# Patient Record
Sex: MALE | Race: WHITE
[De-identification: names, ages, dates, MRNs, and addresses within clinical notes are randomized per-mention and may not be internally consistent; named-entity substitution may affect disease eponyms.]

---

## 2020-02-26 ENCOUNTER — HOSPITAL ENCOUNTER (OUTPATIENT)
Dept: HOSPITAL 76 - LAB.WCP | Age: 70
Discharge: HOME | End: 2020-02-26
Attending: UROLOGY
Payer: MEDICARE

## 2020-02-26 DIAGNOSIS — C61: Primary | ICD-10-CM

## 2020-02-26 PROCEDURE — 36415 COLL VENOUS BLD VENIPUNCTURE: CPT

## 2020-02-26 PROCEDURE — 84153 ASSAY OF PSA TOTAL: CPT

## 2020-09-18 ENCOUNTER — HOSPITAL ENCOUNTER (OUTPATIENT)
Dept: HOSPITAL 76 - LAB.WCP | Age: 70
Discharge: HOME | End: 2020-09-18
Attending: RADIOLOGY
Payer: MEDICARE

## 2020-09-18 DIAGNOSIS — Z85.46: Primary | ICD-10-CM

## 2020-09-18 PROCEDURE — 36415 COLL VENOUS BLD VENIPUNCTURE: CPT

## 2020-09-18 PROCEDURE — 84153 ASSAY OF PSA TOTAL: CPT

## 2021-03-30 ENCOUNTER — HOSPITAL ENCOUNTER (OUTPATIENT)
Dept: HOSPITAL 76 - LAB.WCP | Age: 71
Discharge: HOME | End: 2021-03-30
Attending: PHYSICIAN ASSISTANT
Payer: MEDICARE

## 2021-03-30 DIAGNOSIS — E78.5: Primary | ICD-10-CM

## 2021-03-30 DIAGNOSIS — C61: ICD-10-CM

## 2021-03-30 DIAGNOSIS — J44.9: ICD-10-CM

## 2021-03-30 LAB
ALBUMIN DIAFP-MCNC: 4.3 G/DL (ref 3.2–5.5)
ALBUMIN/GLOB SERPL: 1.5 {RATIO} (ref 1–2.2)
ALP SERPL-CCNC: 47 IU/L (ref 42–121)
ALT SERPL W P-5'-P-CCNC: 20 IU/L (ref 10–60)
ANION GAP SERPL CALCULATED.4IONS-SCNC: 8 MMOL/L (ref 6–13)
AST SERPL W P-5'-P-CCNC: 20 IU/L (ref 10–42)
BASOPHILS NFR BLD AUTO: 0.1 10^3/UL (ref 0–0.1)
BASOPHILS NFR BLD AUTO: 0.8 %
BILIRUB BLD-MCNC: 0.9 MG/DL (ref 0.2–1)
BUN SERPL-MCNC: 21 MG/DL (ref 6–20)
CALCIUM UR-MCNC: 9.1 MG/DL (ref 8.5–10.3)
CHLORIDE SERPL-SCNC: 105 MMOL/L (ref 101–111)
CHOLEST SERPL-MCNC: 123 MG/DL
CO2 SERPL-SCNC: 23 MMOL/L (ref 21–32)
CREAT SERPLBLD-SCNC: 0.9 MG/DL (ref 0.6–1.2)
EOSINOPHIL # BLD AUTO: 0.1 10^3/UL (ref 0–0.7)
EOSINOPHIL NFR BLD AUTO: 2.3 %
ERYTHROCYTE [DISTWIDTH] IN BLOOD BY AUTOMATED COUNT: 13.1 % (ref 12–15)
GFRSERPLBLD MDRD-ARVRAT: 83 ML/MIN/{1.73_M2} (ref 89–?)
GLOBULIN SER-MCNC: 2.9 G/DL (ref 2.1–4.2)
GLUCOSE SERPL-MCNC: 92 MG/DL (ref 70–100)
HCT VFR BLD AUTO: 45.1 % (ref 42–52)
HDLC SERPL-MCNC: 42 MG/DL
HDLC SERPL: 2.9 {RATIO} (ref ?–5)
HGB UR QL STRIP: 15.3 G/DL (ref 14–18)
LDLC SERPL CALC-MCNC: 63 MG/DL
LDLC/HDLC SERPL: 1.5 {RATIO} (ref ?–3.6)
LYMPHOCYTES # SPEC AUTO: 1.9 10^3/UL (ref 1.5–3.5)
LYMPHOCYTES NFR BLD AUTO: 30.9 %
MCH RBC QN AUTO: 32.1 PG (ref 27–31)
MCHC RBC AUTO-ENTMCNC: 33.9 G/DL (ref 32–36)
MCV RBC AUTO: 94.5 FL (ref 80–94)
MONOCYTES # BLD AUTO: 0.5 10^3/UL (ref 0–1)
MONOCYTES NFR BLD AUTO: 8.8 %
NEUTROPHILS # BLD AUTO: 3.5 10^3/UL (ref 1.5–6.6)
NEUTROPHILS # SNV AUTO: 6.1 X10^3/UL (ref 4.8–10.8)
NEUTROPHILS NFR BLD AUTO: 56.9 %
NRBC # BLD AUTO: 0 /100WBC
NRBC # BLD AUTO: 0 X10^3/UL
PDW BLD AUTO: 10.2 FL (ref 7.4–11.4)
PLATELET # BLD: 258 10^3/UL (ref 130–450)
POTASSIUM SERPL-SCNC: 4.1 MMOL/L (ref 3.5–5)
PROT SPEC-MCNC: 7.2 G/DL (ref 6.7–8.2)
RBC MAR: 4.77 10^6/UL (ref 4.7–6.1)
SODIUM SERPLBLD-SCNC: 136 MMOL/L (ref 135–145)
TRIGL P FAST SERPL-MCNC: 90 MG/DL
VLDLC SERPL-SCNC: 18 MG/DL

## 2021-03-30 PROCEDURE — 80061 LIPID PANEL: CPT

## 2021-03-30 PROCEDURE — 84153 ASSAY OF PSA TOTAL: CPT

## 2021-03-30 PROCEDURE — 85025 COMPLETE CBC W/AUTO DIFF WBC: CPT

## 2021-03-30 PROCEDURE — 36415 COLL VENOUS BLD VENIPUNCTURE: CPT

## 2021-03-30 PROCEDURE — 83721 ASSAY OF BLOOD LIPOPROTEIN: CPT

## 2021-03-30 PROCEDURE — 80053 COMPREHEN METABOLIC PANEL: CPT

## 2021-04-23 ENCOUNTER — HOSPITAL ENCOUNTER (OUTPATIENT)
Dept: HOSPITAL 76 - DI | Age: 71
Discharge: HOME | End: 2021-04-23
Attending: PHYSICIAN ASSISTANT
Payer: MEDICARE

## 2021-04-23 DIAGNOSIS — Z12.2: Primary | ICD-10-CM

## 2021-04-23 DIAGNOSIS — F17.219: ICD-10-CM

## 2021-04-23 DIAGNOSIS — R93.2: ICD-10-CM

## 2021-04-23 NOTE — CT REPORT
PROCEDURE:  Low Dose Lung Cancer Screen

 

INDICATIONS:  HX OF SMOKING

 

TECHNIQUE:  

Noncontrast low-dose 5 mm thick sections acquired from the pulmonary apices to the posterior costophr
enic angles.  7 mm thick coronal and sagittal MIP reformats were then acquired.  For radiation dose r
eduction, the following was used:  automated exposure control, adjustment of mA and/or kV according t
o patient size.

 

COMPARISON:  CT chest 12/26/2015, CT IVP 11/28/2018

 

FINDINGS:  

Image quality:  Excellent.  

 

Lungs and pleura:  No effusions or consolidations. No visualized masses or nodules.

 

Mediastinum:  Heart size is normal. Unchanged appearance of mild anterior pericardial effusion.  No m
ediastinal adenopathy by size criteria.  Thoracic aorta and central pulmonary arteries are normal in 
size.  Esophagus is normal in caliber.  No hiatal hernia.  

 

Bones and chest wall:  No suspicious bony lesions.  No vertebral body compression fractures.  No axil
aashish or supraclavicular adenopathy by size criteria.  Thyroid is poorly visualized secondary to artif
act.  

 

Abdomen: Low-attenuation focus in the anterior superior hepatic dome measures 2.1 cm AP by 2.2 cm tra
nsverse compared to 1.4 cm AP by 2.2 cm transverse. Hounsfield units measure approximately 16. Otherw
ise, visualized  upper abdomen solid organs and bowel loops appear normal in the absence of contrast.
  

 

IMPRESSION:  

 

1. Lungs are clear.

 

2. Low-attenuation hepatic structure mildly enlarged with Hounsfield units most suggestive of hepatic
 cyst.

 

Lung rads category 1. Recommend annual screening follow-up.

 

Reviewed by: Patt Flores MD on 4/23/2021 12:29 PM PDT

Approved by: Patt Flores MD on 4/23/2021 12:29 PM PDT

 

 

Station ID:  [id number]

## 2021-09-09 ENCOUNTER — HOSPITAL ENCOUNTER (OUTPATIENT)
Dept: HOSPITAL 76 - LAB.WCP | Age: 71
Discharge: HOME | End: 2021-09-09
Attending: RADIOLOGY
Payer: MEDICARE

## 2021-09-09 DIAGNOSIS — C61: Primary | ICD-10-CM

## 2021-09-09 PROCEDURE — 84153 ASSAY OF PSA TOTAL: CPT

## 2021-09-09 PROCEDURE — 36415 COLL VENOUS BLD VENIPUNCTURE: CPT

## 2022-01-05 ENCOUNTER — HOSPITAL ENCOUNTER (OUTPATIENT)
Dept: HOSPITAL 76 - LAB.WCP | Age: 72
Discharge: HOME | End: 2022-01-05
Attending: PHYSICIAN ASSISTANT
Payer: MEDICARE

## 2022-01-05 DIAGNOSIS — E78.5: Primary | ICD-10-CM

## 2022-01-05 DIAGNOSIS — J44.9: ICD-10-CM

## 2022-01-05 LAB
ALBUMIN DIAFP-MCNC: 4 G/DL (ref 3.2–5.5)
ALBUMIN/GLOB SERPL: 1.3 {RATIO} (ref 1–2.2)
ALP SERPL-CCNC: 48 IU/L (ref 42–121)
ALT SERPL W P-5'-P-CCNC: 16 IU/L (ref 10–60)
ANION GAP SERPL CALCULATED.4IONS-SCNC: 8 MMOL/L (ref 6–13)
AST SERPL W P-5'-P-CCNC: 16 IU/L (ref 10–42)
BASOPHILS NFR BLD AUTO: 0 10^3/UL (ref 0–0.1)
BASOPHILS NFR BLD AUTO: 0.8 %
BILIRUB BLD-MCNC: 0.5 MG/DL (ref 0.2–1)
BUN SERPL-MCNC: 25 MG/DL (ref 6–20)
CALCIUM UR-MCNC: 9.2 MG/DL (ref 8.5–10.3)
CHLORIDE SERPL-SCNC: 107 MMOL/L (ref 101–111)
CHOLEST SERPL-MCNC: 122 MG/DL
CO2 SERPL-SCNC: 26 MMOL/L (ref 21–32)
CREAT SERPLBLD-SCNC: 1 MG/DL (ref 0.6–1.2)
EOSINOPHIL # BLD AUTO: 0.2 10^3/UL (ref 0–0.7)
EOSINOPHIL NFR BLD AUTO: 3.2 %
ERYTHROCYTE [DISTWIDTH] IN BLOOD BY AUTOMATED COUNT: 13.3 % (ref 12–15)
GFRSERPLBLD MDRD-ARVRAT: 73 ML/MIN/{1.73_M2} (ref 89–?)
GLOBULIN SER-MCNC: 3.1 G/DL (ref 2.1–4.2)
GLUCOSE SERPL-MCNC: 100 MG/DL (ref 70–100)
HCT VFR BLD AUTO: 47.2 % (ref 42–52)
HDLC SERPL-MCNC: 44 MG/DL
HDLC SERPL: 2.8 {RATIO} (ref ?–5)
HGB UR QL STRIP: 15.6 G/DL (ref 14–18)
LDLC SERPL CALC-MCNC: 58 MG/DL
LDLC/HDLC SERPL: 1.3 {RATIO} (ref ?–3.6)
LYMPHOCYTES # SPEC AUTO: 1.7 10^3/UL (ref 1.5–3.5)
LYMPHOCYTES NFR BLD AUTO: 32.4 %
MCH RBC QN AUTO: 31.6 PG (ref 27–31)
MCHC RBC AUTO-ENTMCNC: 33.1 G/DL (ref 32–36)
MCV RBC AUTO: 95.7 FL (ref 80–94)
MONOCYTES # BLD AUTO: 0.6 10^3/UL (ref 0–1)
MONOCYTES NFR BLD AUTO: 10.8 %
NEUTROPHILS # BLD AUTO: 2.8 10^3/UL (ref 1.5–6.6)
NEUTROPHILS # SNV AUTO: 5.3 X10^3/UL (ref 4.8–10.8)
NEUTROPHILS NFR BLD AUTO: 52.4 %
NRBC # BLD AUTO: 0 /100WBC
NRBC # BLD AUTO: 0 X10^3/UL
PDW BLD AUTO: 10.3 FL (ref 7.4–11.4)
PLATELET # BLD: 260 10^3/UL (ref 130–450)
POTASSIUM SERPL-SCNC: 4.3 MMOL/L (ref 3.5–5)
PROT SPEC-MCNC: 7.1 G/DL (ref 6.7–8.2)
RBC MAR: 4.93 10^6/UL (ref 4.7–6.1)
SODIUM SERPLBLD-SCNC: 141 MMOL/L (ref 135–145)
TRIGL P FAST SERPL-MCNC: 100 MG/DL
VLDLC SERPL-SCNC: 20 MG/DL

## 2022-01-05 PROCEDURE — 80061 LIPID PANEL: CPT

## 2022-01-05 PROCEDURE — 85025 COMPLETE CBC W/AUTO DIFF WBC: CPT

## 2022-01-05 PROCEDURE — 83721 ASSAY OF BLOOD LIPOPROTEIN: CPT

## 2022-01-05 PROCEDURE — 36415 COLL VENOUS BLD VENIPUNCTURE: CPT

## 2022-01-05 PROCEDURE — 80053 COMPREHEN METABOLIC PANEL: CPT

## 2022-03-25 ENCOUNTER — HOSPITAL ENCOUNTER (OUTPATIENT)
Dept: HOSPITAL 76 - LAB.N | Age: 72
Discharge: HOME | End: 2022-03-25
Attending: UROLOGY
Payer: MEDICARE

## 2022-03-25 DIAGNOSIS — C61: Primary | ICD-10-CM

## 2022-03-25 PROCEDURE — 84153 ASSAY OF PSA TOTAL: CPT

## 2022-03-25 PROCEDURE — 36415 COLL VENOUS BLD VENIPUNCTURE: CPT

## 2022-10-05 ENCOUNTER — HOSPITAL ENCOUNTER (OUTPATIENT)
Dept: HOSPITAL 76 - DI | Age: 72
Discharge: HOME | End: 2022-10-05
Attending: UROLOGY
Payer: MEDICARE

## 2022-10-05 DIAGNOSIS — N20.0: Primary | ICD-10-CM

## 2022-10-05 NOTE — XRAY REPORT
PROCEDURE:  Abdomen 1 View X-Ray

 

INDICATIONS:  NEPHROLITHIASIS

 

TECHNIQUE:  One view of the abdomen acquired.  

 

COMPARISON:  Ultrasound kidneys 3/20/2019

 

FINDINGS:  

 

Surgical changes and devices:  None.  

 

Bowel:  Bowel gas pattern is normal.  

 

Soft tissues:  No suspicious abdominal calcifications.  Visualized solid organ contours appear normal
 in size.  

 

Bones:  No suspicious bony lesions.  

 

IMPRESSION:  

Both renal shadows are obscured by fecal debris. Consider follow-up CT

 

Reviewed by: Jluis Agosto MD on 10/5/2022 12:40 PM AKDT

Approved by: Jluis Agosto MD on 10/5/2022 12:40 PM AKDT

 

 

Station ID:  [id number]

## 2023-05-26 ENCOUNTER — HOSPITAL ENCOUNTER (OUTPATIENT)
Dept: HOSPITAL 76 - LAB.N | Age: 73
Discharge: HOME | End: 2023-05-26
Attending: PHYSICIAN ASSISTANT
Payer: MEDICARE

## 2023-05-26 DIAGNOSIS — C61: Primary | ICD-10-CM

## 2023-05-26 PROCEDURE — 84153 ASSAY OF PSA TOTAL: CPT

## 2023-05-26 PROCEDURE — 36415 COLL VENOUS BLD VENIPUNCTURE: CPT

## 2023-07-27 ENCOUNTER — HOSPITAL ENCOUNTER (OUTPATIENT)
Dept: HOSPITAL 76 - DI | Age: 73
Discharge: HOME | End: 2023-07-27
Attending: PHYSICIAN ASSISTANT
Payer: MEDICARE

## 2023-07-27 DIAGNOSIS — N28.1: ICD-10-CM

## 2023-07-27 DIAGNOSIS — Z12.2: Primary | ICD-10-CM

## 2023-07-27 DIAGNOSIS — I25.10: ICD-10-CM

## 2023-07-27 DIAGNOSIS — F17.210: ICD-10-CM

## 2023-07-27 DIAGNOSIS — S22.20XA: ICD-10-CM

## 2023-07-27 DIAGNOSIS — K44.9: ICD-10-CM

## 2023-07-27 DIAGNOSIS — N20.0: ICD-10-CM

## 2023-07-27 NOTE — XRAY REPORT
PROCEDURE:  Abdomen 1 View X-Ray

 

INDICATIONS:  NEPHROLITHIASIS

 

TECHNIQUE:  One view of the abdomen acquired.  

 

COMPARISON:  CT 10/5/2022

 

FINDINGS:  

 

Surgical changes and devices:  None.  

 

Bowel:  Bowel gas pattern is normal.  

 

Soft tissues:  No suspicious abdominal calcifications.  Visualized solid organ contours appear normal
 in size.  

 

Bones:  No suspicious bony lesions.  

 

IMPRESSION:  

No acute abdominal pathology. No visualized calcifications overlying the visualized renal shadows.

 

 

 

Reviewed by: Patt Flores MD on 7/27/2023 1:10 PM PDT

Approved by: Patt Flores MD on 7/27/2023 1:10 PM PDT

 

 

Station ID:  [id number]

## 2023-07-27 NOTE — CT REPORT
PROCEDURE:  Low Dose Lung Cancer Screen

 

INDICATIONS:  SMOKER

 

TECHNIQUE:  

A CT scan of the chest was performed. Intravenous contrast media was not administered. Images were re
corded and evaluated at appropriate window settings. Reformats: axial MIP of the chest, coronal and s
agittal. For radiation dose reduction, the following was used: automated exposure control, adjustment
 of mA and/or kV according to patient size. 

 

COMPARISON:  Chest, 4/23/2021.

 

FINDINGS:  

Image quality:  Excellent.  

 

Lungs and pleura: No pleural effusions. No  pneumothorax. No suspicious pulmonary nodules which requi
re follow up. 

 

Mediastinum: Heart size is normal. Severe coronary artery atherosclerosis. No pericardial effusion. N
o large vessel abnormality. No mediastinal adenopathy by size criteria.  Small hiatal hernia. 

 

Chest wall and lower neck: Thyroid is unremarkable. No axillary or supraclavicular adenopathy by size
. 

 

Bones: There is a comminuted sternal fracture, new since the last exam.  No aggressive osseous abnorm
ality.

 

Upper Abdomen: There is a 4 mm stone in left kidney. No hydronephrosis. A 8.7 x 6.5 cm cyst is seen i
n the superior pole of the right kidney. A couple of hypodense nodules in liver most likely cysts.

 

IMPRESSION:

 

 

1. Lung RADS: 1 - Negative.

Recommendation: Continue annual screening in 12 Months with LDCT

 

2. Comminuted sternal fracture, new since the last exam. Recommend clinical correlation for history o
f trauma. If no history of trauma, this could represent a pathological fracture.

 

3. Please see body of report for other mediastinal findings.

 

Reviewed by: Nyasia Bernard MD on 7/27/2023 10:04 AM PDT

Approved by: Nyasia Bernard MD on 7/27/2023 10:04 AM PDT

 

 

Station ID:  [id number]

## 2024-04-04 ENCOUNTER — HOSPITAL ENCOUNTER (OUTPATIENT)
Dept: HOSPITAL 76 - LAB.N | Age: 74
Discharge: HOME | End: 2024-04-04
Attending: PHYSICIAN ASSISTANT
Payer: MEDICARE

## 2024-04-04 DIAGNOSIS — E78.5: Primary | ICD-10-CM

## 2024-04-04 DIAGNOSIS — J44.9: ICD-10-CM

## 2024-04-04 LAB
ALBUMIN DIAFP-MCNC: 4.1 G/DL (ref 3.2–5.5)
ALBUMIN/GLOB SERPL: 1.4 {RATIO} (ref 1–2.2)
ALP SERPL-CCNC: 47 IU/L (ref 42–121)
ALT SERPL W P-5'-P-CCNC: 13 IU/L (ref 10–60)
ANION GAP SERPL CALCULATED.4IONS-SCNC: 5 MMOL/L (ref 6–13)
AST SERPL W P-5'-P-CCNC: 17 IU/L (ref 10–42)
BASOPHILS NFR BLD AUTO: 0.1 10^3/UL (ref 0–0.1)
BASOPHILS NFR BLD AUTO: 1.1 %
BILIRUB BLD-MCNC: 0.6 MG/DL (ref 0.2–1)
BUN SERPL-MCNC: 26 MG/DL (ref 6–20)
CALCIUM UR-MCNC: 9.5 MG/DL (ref 8.5–10.3)
CHLORIDE SERPL-SCNC: 110 MMOL/L (ref 101–111)
CHOLEST SERPL-MCNC: 115 MG/DL
CO2 SERPL-SCNC: 25 MMOL/L (ref 21–32)
CREAT SERPLBLD-SCNC: 1 MG/DL (ref 0.6–1.3)
EOSINOPHIL # BLD AUTO: 0.3 10^3/UL (ref 0–0.7)
EOSINOPHIL NFR BLD AUTO: 5.2 %
ERYTHROCYTE [DISTWIDTH] IN BLOOD BY AUTOMATED COUNT: 13.4 % (ref 12–15)
GFRSERPLBLD MDRD-ARVRAT: 73 ML/MIN/{1.73_M2} (ref 89–?)
GLOBULIN SER-MCNC: 2.9 G/DL (ref 2.1–4.2)
GLUCOSE SERPL-MCNC: 98 MG/DL (ref 74–104)
HCT VFR BLD AUTO: 47.2 % (ref 42–52)
HDLC SERPL-MCNC: 40 MG/DL
HDLC SERPL: 2.9 {RATIO} (ref ?–5)
HGB UR QL STRIP: 15.3 G/DL (ref 14–18)
LDLC SERPL CALC-MCNC: 50 MG/DL
LDLC/HDLC SERPL: 1.3 {RATIO} (ref ?–3.6)
LYMPHOCYTES # SPEC AUTO: 2.2 10^3/UL (ref 1.5–3.5)
LYMPHOCYTES NFR BLD AUTO: 41.3 %
MCH RBC QN AUTO: 31.3 PG (ref 27–31)
MCHC RBC AUTO-ENTMCNC: 32.4 G/DL (ref 32–36)
MCV RBC AUTO: 96.5 FL (ref 80–94)
MONOCYTES # BLD AUTO: 0.6 10^3/UL (ref 0–1)
MONOCYTES NFR BLD AUTO: 11 %
NEUTROPHILS # BLD AUTO: 2.2 10^3/UL (ref 1.5–6.6)
NEUTROPHILS # SNV AUTO: 5.4 X10^3/UL (ref 4.8–10.8)
NEUTROPHILS NFR BLD AUTO: 41.2 %
NRBC # BLD AUTO: 0 /100WBC
NRBC # BLD AUTO: 0 X10^3/UL
PDW BLD AUTO: 10.8 FL (ref 7.4–11.4)
PLATELET # BLD: 240 10^3/UL (ref 130–450)
POTASSIUM SERPL-SCNC: 4.4 MMOL/L (ref 3.5–4.5)
PROT SPEC-MCNC: 7 G/DL (ref 6.4–8.9)
RBC MAR: 4.89 10^6/UL (ref 4.7–6.1)
SODIUM SERPLBLD-SCNC: 140 MMOL/L (ref 135–145)
TRIGL P FAST SERPL-MCNC: 125 MG/DL (ref 48–352)
VLDLC SERPL-SCNC: 25 MG/DL

## 2024-04-04 PROCEDURE — 83721 ASSAY OF BLOOD LIPOPROTEIN: CPT

## 2024-04-04 PROCEDURE — 36415 COLL VENOUS BLD VENIPUNCTURE: CPT

## 2024-04-04 PROCEDURE — 80053 COMPREHEN METABOLIC PANEL: CPT

## 2024-04-04 PROCEDURE — 85025 COMPLETE CBC W/AUTO DIFF WBC: CPT

## 2024-04-04 PROCEDURE — 80061 LIPID PANEL: CPT

## 2024-08-22 ENCOUNTER — HOSPITAL ENCOUNTER (OUTPATIENT)
Dept: HOSPITAL 76 - DI | Age: 74
Discharge: HOME | End: 2024-08-22
Attending: PHYSICIAN ASSISTANT
Payer: MEDICARE

## 2024-08-22 ENCOUNTER — HOSPITAL ENCOUNTER (OUTPATIENT)
Dept: HOSPITAL 76 - LAB.N | Age: 74
Discharge: HOME | End: 2024-08-22
Attending: PHYSICIAN ASSISTANT
Payer: MEDICARE

## 2024-08-22 DIAGNOSIS — Z85.46: Primary | ICD-10-CM

## 2024-08-22 DIAGNOSIS — N20.0: Primary | ICD-10-CM

## 2024-08-22 DIAGNOSIS — Z85.46: ICD-10-CM

## 2024-08-22 PROCEDURE — 84153 ASSAY OF PSA TOTAL: CPT

## 2024-08-22 PROCEDURE — 36415 COLL VENOUS BLD VENIPUNCTURE: CPT

## 2024-08-22 NOTE — XRAY REPORT
PROCEDURE:  Abdomen 1 V

 

INDICATIONS:  NEPHROLITIASIS

 

TECHNIQUE:  One view of the abdomen acquired.  

 

COMPARISON:  Prior [facility] dated 7/27/2023

 

FINDINGS:  

 

Surgical changes and devices:  None.  

 

Bowel:  Bowel gas pattern is normal.  

 

Soft tissues:  No suspicious abdominal calcifications.  Visualized solid organ contours appear normal
 in size.  

 

Bones:  No suspicious bony lesions.  

 

IMPRESSION:  

No radiopaque renal stones. Note, the pelvis including the urinary bladder is not included on the asha
m.

 

 

 

Reviewed by: GAYLE Geller on 8/22/2024 10:10 PM PDT

Approved by: Patt Flores MD on 8/22/2024 10:10 PM PDT

 

 

Station ID:  [id number]